# Patient Record
Sex: FEMALE | Race: WHITE | NOT HISPANIC OR LATINO | Employment: PART TIME | URBAN - METROPOLITAN AREA
[De-identification: names, ages, dates, MRNs, and addresses within clinical notes are randomized per-mention and may not be internally consistent; named-entity substitution may affect disease eponyms.]

---

## 2018-07-14 ENCOUNTER — HOSPITAL ENCOUNTER (EMERGENCY)
Facility: HOSPITAL | Age: 28
Discharge: HOME | End: 2018-07-14
Attending: EMERGENCY MEDICINE
Payer: MEDICAID

## 2018-07-14 VITALS
WEIGHT: 182 LBS | DIASTOLIC BLOOD PRESSURE: 78 MMHG | BODY MASS INDEX: 33.49 KG/M2 | HEART RATE: 92 BPM | SYSTOLIC BLOOD PRESSURE: 120 MMHG | TEMPERATURE: 99 F | RESPIRATION RATE: 16 BRPM | OXYGEN SATURATION: 98 % | HEIGHT: 62 IN

## 2018-07-14 DIAGNOSIS — N23 RENAL COLIC ON RIGHT SIDE: Primary | ICD-10-CM

## 2018-07-14 LAB
ALBUMIN SERPL BCP-MCNC: 3.7 G/DL
ALP SERPL-CCNC: 92 U/L
ALT SERPL W/O P-5'-P-CCNC: 12 U/L
ANION GAP SERPL CALC-SCNC: 9 MMOL/L
AST SERPL-CCNC: 16 U/L
B-HCG UR QL: NEGATIVE
BASOPHILS # BLD AUTO: 0.07 K/UL
BASOPHILS NFR BLD: 0.6 %
BILIRUB SERPL-MCNC: 0.5 MG/DL
BILIRUB UR QL STRIP: NEGATIVE
BUN SERPL-MCNC: 13 MG/DL
CALCIUM SERPL-MCNC: 9.6 MG/DL
CHLORIDE SERPL-SCNC: 106 MMOL/L
CLARITY UR REFRACT.AUTO: CLEAR
CO2 SERPL-SCNC: 22 MMOL/L
COLOR UR AUTO: YELLOW
CREAT SERPL-MCNC: 0.7 MG/DL
CTP QC/QA: YES
DIFFERENTIAL METHOD: ABNORMAL
EOSINOPHIL # BLD AUTO: 0.1 K/UL
EOSINOPHIL NFR BLD: 1.3 %
ERYTHROCYTE [DISTWIDTH] IN BLOOD BY AUTOMATED COUNT: 13.4 %
EST. GFR  (AFRICAN AMERICAN): >60 ML/MIN/1.73 M^2
EST. GFR  (NON AFRICAN AMERICAN): >60 ML/MIN/1.73 M^2
GLUCOSE SERPL-MCNC: 96 MG/DL
GLUCOSE UR QL STRIP: NEGATIVE
HCT VFR BLD AUTO: 41.7 %
HGB BLD-MCNC: 13.9 G/DL
HGB UR QL STRIP: NEGATIVE
IMM GRANULOCYTES # BLD AUTO: 0.05 K/UL
IMM GRANULOCYTES NFR BLD AUTO: 0.5 %
KETONES UR QL STRIP: NEGATIVE
LEUKOCYTE ESTERASE UR QL STRIP: NEGATIVE
LYMPHOCYTES # BLD AUTO: 2.6 K/UL
LYMPHOCYTES NFR BLD: 23.3 %
MCH RBC QN AUTO: 29.4 PG
MCHC RBC AUTO-ENTMCNC: 33.3 G/DL
MCV RBC AUTO: 88 FL
MONOCYTES # BLD AUTO: 0.9 K/UL
MONOCYTES NFR BLD: 8.2 %
NEUTROPHILS # BLD AUTO: 7.4 K/UL
NEUTROPHILS NFR BLD: 66.1 %
NITRITE UR QL STRIP: NEGATIVE
NRBC BLD-RTO: 0 /100 WBC
PH UR STRIP: 6 [PH] (ref 5–8)
PLATELET # BLD AUTO: 359 K/UL
PMV BLD AUTO: 9.7 FL
POTASSIUM SERPL-SCNC: 3.6 MMOL/L
PROT SERPL-MCNC: 7.8 G/DL
PROT UR QL STRIP: NEGATIVE
RBC # BLD AUTO: 4.72 M/UL
SODIUM SERPL-SCNC: 137 MMOL/L
SP GR UR STRIP: 1.02 (ref 1–1.03)
URN SPEC COLLECT METH UR: NORMAL
UROBILINOGEN UR STRIP-ACNC: NEGATIVE EU/DL
WBC # BLD AUTO: 11.11 K/UL

## 2018-07-14 PROCEDURE — 99284 EMERGENCY DEPT VISIT MOD MDM: CPT | Mod: ,,, | Performed by: EMERGENCY MEDICINE

## 2018-07-14 PROCEDURE — 81003 URINALYSIS AUTO W/O SCOPE: CPT

## 2018-07-14 PROCEDURE — 96374 THER/PROPH/DIAG INJ IV PUSH: CPT

## 2018-07-14 PROCEDURE — 96361 HYDRATE IV INFUSION ADD-ON: CPT

## 2018-07-14 PROCEDURE — 80053 COMPREHEN METABOLIC PANEL: CPT

## 2018-07-14 PROCEDURE — 81025 URINE PREGNANCY TEST: CPT | Performed by: EMERGENCY MEDICINE

## 2018-07-14 PROCEDURE — 85025 COMPLETE CBC W/AUTO DIFF WBC: CPT

## 2018-07-14 PROCEDURE — 99284 EMERGENCY DEPT VISIT MOD MDM: CPT | Mod: 25

## 2018-07-14 PROCEDURE — 25000003 PHARM REV CODE 250: Performed by: EMERGENCY MEDICINE

## 2018-07-14 PROCEDURE — 63600175 PHARM REV CODE 636 W HCPCS: Performed by: EMERGENCY MEDICINE

## 2018-07-14 RX ORDER — HYDROCODONE BITARTRATE AND ACETAMINOPHEN 5; 325 MG/1; MG/1
1 TABLET ORAL EVERY 4 HOURS PRN
Qty: 18 TABLET | Refills: 0 | Status: SHIPPED | OUTPATIENT
Start: 2018-07-14 | End: 2018-11-05

## 2018-07-14 RX ORDER — ESCITALOPRAM OXALATE 10 MG/1
10 TABLET ORAL DAILY
COMMUNITY
End: 2018-11-05

## 2018-07-14 RX ORDER — KETOROLAC TROMETHAMINE 30 MG/ML
10 INJECTION, SOLUTION INTRAMUSCULAR; INTRAVENOUS
Status: COMPLETED | OUTPATIENT
Start: 2018-07-14 | End: 2018-07-14

## 2018-07-14 RX ORDER — ONDANSETRON 4 MG/1
4 TABLET, FILM COATED ORAL EVERY 6 HOURS
Qty: 12 TABLET | Refills: 0 | Status: SHIPPED | OUTPATIENT
Start: 2018-07-14 | End: 2018-11-05

## 2018-07-14 RX ADMIN — KETOROLAC TROMETHAMINE 10 MG: 30 INJECTION, SOLUTION INTRAMUSCULAR at 05:07

## 2018-07-14 RX ADMIN — SODIUM CHLORIDE 1000 ML: 0.9 INJECTION, SOLUTION INTRAVENOUS at 04:07

## 2018-07-14 NOTE — ED TRIAGE NOTES
Presents to ER with RUQ pain with nausea since this am.  Patient's name and date of birth checked and is correct.  LOC: The patient is awake, alert and aware of environment with an appropriate affect, the patient is oriented x 3 and speaking appropriately.  APPEARANCE: Patient resting comfortably and in no acute distress, patient is clean and well groomed, patient's clothing is properly fastened.  CARDIOVASCULAR:  Heart rate regular and even with no peripheral edema noted.  SKIN: The skin is warm and dry, patient has normal skin turgor and moist mucus membranes, skin intact, no breakdown or brusing noted. MUSKULOSKELETAL: Patient moving all extremities well, no obvious swelling or deformities noted.  RESPIRATORY: Airway is open and patent, respirations are spontaneous, patient has a normal effort and rate.

## 2018-07-14 NOTE — ED PROVIDER NOTES
Encounter Date: 7/14/2018    SCRIBE #1 NOTE: I, Mora Rosalino, am scribing for, and in the presence of, Dr. Mondragon.       History     Chief Complaint   Patient presents with    Abdominal Pain     right sided abd pain since 7am getting worse.      Time seen by provider: 4:34 PM    This is a 27 y.o. female with no pertinent medical conditions, who presents with complaint of severe right flank pain that is intermittent and worse with different positions or lifting. She also has associated nausea and lightheadedness. The patient just finished her last period yesterday. The patient reports the pain began in her right lower back and radiated around her side to her RLQ. She denies fever, chills, urinary or bowel complaints at this time.       The history is provided by the patient.     Review of patient's allergies indicates:   Allergen Reactions    Amoxicillin     Latex, natural rubber Hives     Past Medical History:   Diagnosis Date    Bipolar 1 disorder      History reviewed. No pertinent surgical history.  Family History   Problem Relation Age of Onset    Hypertension Maternal Grandmother     Diabetes Paternal Grandmother     Breast cancer Neg Hx     Colon cancer Neg Hx     Ovarian cancer Neg Hx      Social History   Substance Use Topics    Smoking status: Former Smoker     Packs/day: 0.25     Types: Cigarettes    Smokeless tobacco: Never Used    Alcohol use Yes      Comment: occasionally; not during pregnancy     Review of Systems   Constitutional: Negative for chills and fever.   Gastrointestinal: Positive for abdominal pain and nausea. Negative for vomiting.   Genitourinary: Positive for flank pain. Negative for decreased urine volume and dysuria.   All other systems reviewed and are negative.      Physical Exam     Initial Vitals [07/14/18 1607]   BP Pulse Resp Temp SpO2   119/85 (!) 113 20 98.9 °F (37.2 °C) 98 %      MAP       --         Physical Exam    Nursing note and vitals  reviewed.  Constitutional: She appears well-developed and well-nourished. No distress.   HENT:   Head: Normocephalic and atraumatic.   Eyes: EOM are normal. Pupils are equal, round, and reactive to light.   Neck: Normal range of motion. Neck supple.   Cardiovascular: Normal rate, regular rhythm and normal heart sounds.   Pulmonary/Chest: Breath sounds normal. No respiratory distress.   Abdominal: There is tenderness (mild RLQ).   Musculoskeletal: Normal range of motion. She exhibits no edema.   Neurological: She is alert and oriented to person, place, and time. She has normal strength.   Skin: Skin is warm and dry.         ED Course   Procedures  Labs Reviewed   CBC W/ AUTO DIFFERENTIAL - Abnormal; Notable for the following:        Result Value    Platelets 359 (*)     Immature Grans (Abs) 0.05 (*)     All other components within normal limits   COMPREHENSIVE METABOLIC PANEL - Abnormal; Notable for the following:     CO2 22 (*)     All other components within normal limits   URINALYSIS, REFLEX TO URINE CULTURE    Narrative:     Preferred Collection Type->Urine, Clean Catch   POCT URINE PREGNANCY          Imaging Results          CT Renal Stone Study ABD Pelvis WO (Final result)  Result time 07/14/18 18:04:25    Final result by Fred Rodriguez MD (07/14/18 18:04:25)                 Impression:      1. Mild prominence of the right ureter, no definite calculi seen noting suspected phleboliths within the lower pelvis rather than distal ureteral calculus.  Correlation with urinalysis however is recommended to exclude hematuria and sequela of recently passed calculus versus infection.  2. Free fluid in the deep pelvis, higher in attenuation than would be suspected for simple fluid, this is nonspecific, blood products are a consideration in the setting of ruptured ovarian cyst although correlation is needed.  Ultrasound could be performed as warranted.  3. Moderate stool in the right colon noting findings suggesting  right colonic constipation.  4. Minimally prominent mesenteric lymph nodes, nonspecific.  5. No convincing findings to suggest appendicitis.  6. Hepatomegaly, correlation with LFTs recommended.  7. Additional findings above.      Electronically signed by: Fred Rodriguez MD  Date:    07/14/2018  Time:    18:04             Narrative:    EXAMINATION:  CT RENAL STONE STUDY ABD PELVIS WO    CLINICAL HISTORY:  Flank pain, stone disease suspected;    TECHNIQUE:  Low dose axial images, sagittal and coronal reformations were obtained from the lung bases to the pubic symphysis.  Contrast was not administered.    COMPARISON:  None    FINDINGS:  Images of the lower thorax are grossly unremarkable.    The liver is mildly enlarged, correlation with LFTs recommended.  The spleen, pancreas, gallbladder and adrenal glands are grossly unremarkable.  There is mild indistinctness about the root of the mesentery, nonspecific.  No significant abdominal lymphadenopathy.    There is left nonobstructive nephrolithiasis versus artifact, no definite right nephrolithiasis.  There is no hydronephrosis.  There is mild prominence of the right renal collecting system, and mild right ureteral dilation noting the distal right ureter is unable to be followed in its entirety to the urinary bladder.  There is a punctate calcification potentially along the course of the right ureter although cannot be confirmed.  This measures 1-2 mm.  The urinary bladder is unremarkable.  The uterus and adnexa is grossly unremarkable.  There is a small amount of free fluid in the pelvis, attenuation is higher than would be expected for simple fluid.  The large bowel is grossly unremarkable.  There is stool within the terminal ileum, a nonspecific finding that can be associated with developing constipation.  The appendix is suboptimally visualized, what is suspected to represent the appendix appears grossly unremarkable.  The small bowel is grossly unremarkable.    No  focal osseous destructive process.  No significant inguinal lymphadenopathy.  There are several upper limit of normal mesenteric lymph nodes.                                 Medical Decision Making:   History:   Old Medical Records: I decided to obtain old medical records.            Scribe Attestation:   Scribe #1: I performed the above scribed service and the documentation accurately describes the services I performed. I attest to the accuracy of the note.               Clinical Impression:   The encounter diagnosis was Renal colic on right side.                             Chris Mondragon MD  07/14/18 [id number]

## 2018-10-25 PROBLEM — Z98.891 PREVIOUS CESAREAN SECTION: Status: ACTIVE | Noted: 2018-10-25

## 2018-10-25 PROBLEM — F31.9 BIPOLAR 1 DISORDER: Status: ACTIVE | Noted: 2018-10-25

## 2019-05-31 PROBLEM — O99.820 GBS (GROUP B STREPTOCOCCUS CARRIER), +RV CULTURE, CURRENTLY PREGNANT: Status: ACTIVE | Noted: 2019-05-31

## 2019-06-12 PROBLEM — Z30.09 FAMILY PLANNING: Status: ACTIVE | Noted: 2019-06-12

## 2019-06-19 PROBLEM — O09.299 HISTORY OF MACROSOMIA IN INFANT IN PRIOR PREGNANCY, CURRENTLY PREGNANT: Status: ACTIVE | Noted: 2019-06-19

## 2019-06-20 PROBLEM — O99.820 GBS (GROUP B STREPTOCOCCUS CARRIER), +RV CULTURE, CURRENTLY PREGNANT: Status: RESOLVED | Noted: 2019-05-31 | Resolved: 2019-06-20

## 2019-06-26 PROBLEM — Z98.51 H/O TUBAL LIGATION: Status: ACTIVE | Noted: 2019-06-12

## 2020-05-25 PROBLEM — O09.299 HISTORY OF MACROSOMIA IN INFANT IN PRIOR PREGNANCY, CURRENTLY PREGNANT: Status: RESOLVED | Noted: 2019-06-19 | Resolved: 2020-05-25
